# Patient Record
Sex: FEMALE | Race: BLACK OR AFRICAN AMERICAN | NOT HISPANIC OR LATINO | Employment: FULL TIME | URBAN - NONMETROPOLITAN AREA
[De-identification: names, ages, dates, MRNs, and addresses within clinical notes are randomized per-mention and may not be internally consistent; named-entity substitution may affect disease eponyms.]

---

## 2017-08-16 ENCOUNTER — OFFICE VISIT (OUTPATIENT)
Dept: NEUROSURGERY | Facility: CLINIC | Age: 42
End: 2017-08-16

## 2017-08-16 VITALS
SYSTOLIC BLOOD PRESSURE: 100 MMHG | DIASTOLIC BLOOD PRESSURE: 60 MMHG | WEIGHT: 180 LBS | HEIGHT: 63 IN | BODY MASS INDEX: 31.89 KG/M2

## 2017-08-16 DIAGNOSIS — Z78.9 NON-SMOKER: ICD-10-CM

## 2017-08-16 DIAGNOSIS — M47.16 OSTEOARTHRITIS OF LUMBAR SPINE WITH MYELOPATHY: Primary | ICD-10-CM

## 2017-08-16 DIAGNOSIS — E66.3 OVERWEIGHT: ICD-10-CM

## 2017-08-16 PROBLEM — M47.816 LUMBAR SPONDYLOSIS: Status: ACTIVE | Noted: 2017-08-16

## 2017-08-16 PROCEDURE — 99203 OFFICE O/P NEW LOW 30 MIN: CPT | Performed by: NEUROLOGICAL SURGERY

## 2017-08-16 RX ORDER — CYCLOBENZAPRINE HCL 5 MG
TABLET ORAL
COMMUNITY
Start: 2017-05-31

## 2017-08-16 RX ORDER — PNV NO.95/FERROUS FUM/FOLIC AC 28MG-0.8MG
TABLET ORAL
COMMUNITY
Start: 2017-06-16

## 2017-08-16 RX ORDER — GABAPENTIN 300 MG/1
CAPSULE ORAL
COMMUNITY
Start: 2017-07-25

## 2017-08-16 NOTE — PROGRESS NOTES
"    Chief complaint   Chief Complaint   Patient presents with   • Back Pain        Subjective     HPI:     This patient is a 41-year-old female who recently has returned back to work.  5-6 months ago she was washing close and picked up a laundry basket and had a sudden onset of severe low back pain and pain that radiates down both legs.  She has started Neurontin and this has completely relieved her leg pain.  However her back pain remains persistent.  She has had 2 month trial physical therapy without relief of her symptoms.  Her chief complaint remains generalized low back pain.  Neurontin helps her symptoms however she is very sensitive to this medication and has had to decrease the dose secondary to it causing lethargy.    Review of Systems     12 point review of systems is obtained and is negative except for as described in HPI    Past Medical History:   Diagnosis Date   • Claustrophobia      Past Surgical History:   Procedure Laterality Date   •  SECTION      x 2     History reviewed. No pertinent family history.  Social History   Substance Use Topics   • Smoking status: Never Smoker   • Smokeless tobacco: None   • Alcohol use No       (Not in a hospital admission)  Allergies:  Review of patient's allergies indicates no known allergies.    Objective      Vital Signs  /60  Ht 63\" (160 cm)  Wt 180 lb (81.6 kg)  BMI 31.89 kg/m2    Physical Exam    No acute distress  Awake alert oriented ×3  HEENT atraumatic normocephalic  Neck supple  Abdomen soft nontender  Extremities no clubbing, edema, cyanosis  Neurologic exam  Cranial nerves II through XII intact  No pronator drift  Patient moves all externus 5 out of 5 strength  2+ biceps reflex, 2+ patellar reflex bilaterally  Gait is normal  No long tract signs  No clonus  No cerebellar findings    Results Review:     MRI lumbar spine from 2017 reveals L4, 5 disc desiccation with herniation in effacement along the left lateral recess causing spinal " stenosis that is moderate to severe          Assessment/Plan:     41-year-old female with a 5-6 month history of low back pain and bilateral lower extremity pain.  Her bilateral lower extremity pain is currently resolved.  I have reviewed imaging findings with the patient and I have discussed the risks, benefits, alternatives of a lumbar decompression surgery to alleviate her spinal stenosis.  She voices understanding and states that she does not have signs or symptoms of neurogenic claudication and radiculopathy and declines surgical intervention at this time.  She requests referral to physical therapy and pain management.  We will make these referrals for her and follow-up with her in the near future to reevaluate how she is doing.  Thank you for this consultation.    I discussed the patients findings and my recommendations with patient    Faisal Ortiz MD  08/16/17  5:32 PM